# Patient Record
Sex: FEMALE | Race: WHITE
[De-identification: names, ages, dates, MRNs, and addresses within clinical notes are randomized per-mention and may not be internally consistent; named-entity substitution may affect disease eponyms.]

---

## 2022-01-07 ENCOUNTER — HOSPITAL ENCOUNTER (INPATIENT)
Dept: HOSPITAL 54 - ER | Age: 52
LOS: 3 days | Discharge: NURSING, REHAB OR LONG-TERM CARE | DRG: 137 | End: 2022-01-10
Attending: INTERNAL MEDICINE | Admitting: INTERNAL MEDICINE
Payer: COMMERCIAL

## 2022-01-07 VITALS — BODY MASS INDEX: 22.98 KG/M2 | WEIGHT: 143 LBS | HEIGHT: 66 IN

## 2022-01-07 VITALS — DIASTOLIC BLOOD PRESSURE: 58 MMHG | SYSTOLIC BLOOD PRESSURE: 133 MMHG

## 2022-01-07 DIAGNOSIS — Z99.11: ICD-10-CM

## 2022-01-07 DIAGNOSIS — J95.851: Primary | ICD-10-CM

## 2022-01-07 DIAGNOSIS — Z79.899: ICD-10-CM

## 2022-01-07 DIAGNOSIS — R65.20: ICD-10-CM

## 2022-01-07 DIAGNOSIS — E11.9: ICD-10-CM

## 2022-01-07 DIAGNOSIS — Z20.822: ICD-10-CM
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
## 2022-01-07 DIAGNOSIS — Z79.4: ICD-10-CM

## 2022-01-07 DIAGNOSIS — J90: ICD-10-CM

## 2022-01-07 DIAGNOSIS — J18.9: ICD-10-CM

## 2022-01-07 DIAGNOSIS — R13.10: ICD-10-CM

## 2022-01-07 DIAGNOSIS — D64.9: ICD-10-CM

## 2022-01-07 DIAGNOSIS — I48.20: ICD-10-CM

## 2022-01-07 DIAGNOSIS — I48.91: ICD-10-CM

## 2022-01-07 DIAGNOSIS — D68.59: ICD-10-CM

## 2022-01-07 DIAGNOSIS — J96.21: ICD-10-CM
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
## 2022-01-07 DIAGNOSIS — Z86.711: ICD-10-CM

## 2022-01-07 DIAGNOSIS — G25.9: ICD-10-CM

## 2022-01-07 DIAGNOSIS — Z79.01: ICD-10-CM

## 2022-01-07 DIAGNOSIS — A41.9: ICD-10-CM

## 2022-01-07 DIAGNOSIS — B96.89: ICD-10-CM

## 2022-01-07 DIAGNOSIS — N39.0: ICD-10-CM

## 2022-01-07 DIAGNOSIS — G93.40: ICD-10-CM

## 2022-01-07 DIAGNOSIS — Z79.84: ICD-10-CM

## 2022-01-07 LAB
ALBUMIN SERPL BCP-MCNC: 3.3 G/DL (ref 3.4–5)
ALP SERPL-CCNC: 95 U/L (ref 46–116)
ALT SERPL W P-5'-P-CCNC: 28 U/L (ref 12–78)
AST SERPL W P-5'-P-CCNC: 21 U/L (ref 15–37)
BASOPHILS # BLD AUTO: 0 K/UL (ref 0–0.2)
BASOPHILS # BLD AUTO: 0.1 K/UL (ref 0–0.2)
BASOPHILS NFR BLD AUTO: 0.4 % (ref 0–2)
BASOPHILS NFR BLD AUTO: 0.4 % (ref 0–2)
BILIRUB DIRECT SERPL-MCNC: 0.1 MG/DL (ref 0–0.2)
BILIRUB SERPL-MCNC: 0.2 MG/DL (ref 0.2–1)
BILIRUB UR QL STRIP: NEGATIVE
BUN SERPL-MCNC: 18 MG/DL (ref 7–18)
BUN SERPL-MCNC: 20 MG/DL (ref 7–18)
CALCIUM SERPL-MCNC: 8.6 MG/DL (ref 8.5–10.1)
CALCIUM SERPL-MCNC: 9.6 MG/DL (ref 8.5–10.1)
CHLORIDE SERPL-SCNC: 105 MMOL/L (ref 98–107)
CHLORIDE SERPL-SCNC: 106 MMOL/L (ref 98–107)
CO2 SERPL-SCNC: 27 MMOL/L (ref 21–32)
CO2 SERPL-SCNC: 32 MMOL/L (ref 21–32)
COLOR UR: (no result)
CREAT SERPL-MCNC: 0.9 MG/DL (ref 0.6–1.3)
CREAT SERPL-MCNC: 0.9 MG/DL (ref 0.6–1.3)
EOSINOPHIL NFR BLD AUTO: 0.6 % (ref 0–6)
EOSINOPHIL NFR BLD AUTO: 1.9 % (ref 0–6)
GLUCOSE SERPL-MCNC: 116 MG/DL (ref 74–106)
GLUCOSE SERPL-MCNC: 120 MG/DL (ref 74–106)
GLUCOSE UR STRIP-MCNC: NEGATIVE MG/DL
HCT VFR BLD AUTO: 28 % (ref 33–45)
HCT VFR BLD AUTO: 34 % (ref 33–45)
HGB BLD-MCNC: 11.1 G/DL (ref 11.5–14.8)
HGB BLD-MCNC: 9.2 G/DL (ref 11.5–14.8)
LEUKOCYTE ESTERASE UR QL STRIP: (no result)
LYMPHOCYTES NFR BLD AUTO: 18.4 % (ref 20–44)
LYMPHOCYTES NFR BLD AUTO: 2.3 K/UL (ref 0.8–4.8)
LYMPHOCYTES NFR BLD AUTO: 25.6 % (ref 20–44)
LYMPHOCYTES NFR BLD AUTO: 3.6 K/UL (ref 0.8–4.8)
MAGNESIUM SERPL-MCNC: 2 MG/DL (ref 1.8–2.4)
MCHC RBC AUTO-ENTMCNC: 33 G/DL (ref 31–36)
MCHC RBC AUTO-ENTMCNC: 33 G/DL (ref 31–36)
MCV RBC AUTO: 88 FL (ref 82–100)
MCV RBC AUTO: 89 FL (ref 82–100)
MONOCYTES NFR BLD AUTO: 0.6 K/UL (ref 0.1–1.3)
MONOCYTES NFR BLD AUTO: 0.6 K/UL (ref 0.1–1.3)
MONOCYTES NFR BLD AUTO: 4.4 % (ref 2–12)
MONOCYTES NFR BLD AUTO: 4.6 % (ref 2–12)
NEUTROPHILS # BLD AUTO: 9.4 K/UL (ref 1.8–8.9)
NEUTROPHILS # BLD AUTO: 9.6 K/UL (ref 1.8–8.9)
NEUTROPHILS NFR BLD AUTO: 67.7 % (ref 43–81)
NEUTROPHILS NFR BLD AUTO: 76 % (ref 43–81)
NITRITE UR QL STRIP: NEGATIVE
PH UR STRIP: 6.5 [PH] (ref 5–8)
PHOSPHATE SERPL-MCNC: 2.1 MG/DL (ref 2.5–4.9)
PLATELET # BLD AUTO: 345 K/UL (ref 150–450)
PLATELET # BLD AUTO: 409 K/UL (ref 150–450)
POTASSIUM SERPL-SCNC: 4.1 MMOL/L (ref 3.5–5.1)
POTASSIUM SERPL-SCNC: 4.7 MMOL/L (ref 3.5–5.1)
PROT SERPL-MCNC: 8.4 G/DL (ref 6.4–8.2)
PROT UR QL STRIP: 30 MG/DL
RBC # BLD AUTO: 3.18 MIL/UL (ref 4–5.2)
RBC # BLD AUTO: 3.81 MIL/UL (ref 4–5.2)
RBC #/AREA URNS HPF: (no result) /HPF (ref 0–2)
SODIUM SERPL-SCNC: 141 MMOL/L (ref 136–145)
SODIUM SERPL-SCNC: 142 MMOL/L (ref 136–145)
UROBILINOGEN UR STRIP-MCNC: 0.2 EU/DL
WBC NRBC COR # BLD AUTO: 12.4 K/UL (ref 4.3–11)
WBC NRBC COR # BLD AUTO: 14.2 K/UL (ref 4.3–11)

## 2022-01-07 PROCEDURE — A4624 TRACHEAL SUCTION TUBE: HCPCS

## 2022-01-07 PROCEDURE — 5A1945Z RESPIRATORY VENTILATION, 24-96 CONSECUTIVE HOURS: ICD-10-PCS | Performed by: INTERNAL MEDICINE

## 2022-01-07 PROCEDURE — U0003 INFECTIOUS AGENT DETECTION BY NUCLEIC ACID (DNA OR RNA); SEVERE ACUTE RESPIRATORY SYNDROME CORONAVIRUS 2 (SARS-COV-2) (CORONAVIRUS DISEASE [COVID-19]), AMPLIFIED PROBE TECHNIQUE, MAKING USE OF HIGH THROUGHPUT TECHNOLOGIES AS DESCRIBED BY CMS-2020-01-R: HCPCS

## 2022-01-07 PROCEDURE — G0378 HOSPITAL OBSERVATION PER HR: HCPCS

## 2022-01-07 RX ADMIN — Medication SCH EACH: at 18:09

## 2022-01-07 RX ADMIN — OXYCODONE HYDROCHLORIDE AND ACETAMINOPHEN SCH MG: 500 TABLET ORAL at 09:38

## 2022-01-07 RX ADMIN — APIXABAN SCH MG: 5 TABLET, FILM COATED ORAL at 09:38

## 2022-01-07 RX ADMIN — Medication SCH EACH: at 12:00

## 2022-01-07 RX ADMIN — PIPERACILLIN SODIUM AND TAZOBACTAM SODIUM SCH MLS/HR: .375; 3 INJECTION, POWDER, LYOPHILIZED, FOR SOLUTION INTRAVENOUS at 18:55

## 2022-01-07 RX ADMIN — IPRATROPIUM BROMIDE AND ALBUTEROL SULFATE SCH INHALER: 103; 18 AEROSOL, METERED RESPIRATORY (INHALATION) at 07:58

## 2022-01-07 RX ADMIN — DEXTROSE MONOHYDRATE SCH MLS/HR: 50 INJECTION, SOLUTION INTRAVENOUS at 20:06

## 2022-01-07 RX ADMIN — ACETAMINOPHEN PRN MG: 325 TABLET ORAL at 18:07

## 2022-01-07 RX ADMIN — PIPERACILLIN SODIUM AND TAZOBACTAM SODIUM SCH MLS/HR: .375; 3 INJECTION, POWDER, LYOPHILIZED, FOR SOLUTION INTRAVENOUS at 10:19

## 2022-01-07 RX ADMIN — FERROUS SULFATE TAB 325 MG (65 MG ELEMENTAL FE) SCH MG: 325 (65 FE) TAB at 09:38

## 2022-01-07 RX ADMIN — SODIUM CHLORIDE PRN MLS/HR: 9 INJECTION, SOLUTION INTRAVENOUS at 05:42

## 2022-01-07 RX ADMIN — DEXTROSE MONOHYDRATE SCH MLS/HR: 50 INJECTION, SOLUTION INTRAVENOUS at 15:57

## 2022-01-07 RX ADMIN — IPRATROPIUM BROMIDE AND ALBUTEROL SULFATE SCH INHALER: 103; 18 AEROSOL, METERED RESPIRATORY (INHALATION) at 13:39

## 2022-01-07 RX ADMIN — IPRATROPIUM BROMIDE AND ALBUTEROL SULFATE SCH PUFF: 103; 18 AEROSOL, METERED RESPIRATORY (INHALATION) at 22:20

## 2022-01-07 RX ADMIN — MEROPENEM SCH MLS/HR: 500 INJECTION INTRAVENOUS at 21:00

## 2022-01-07 RX ADMIN — LORAZEPAM PRN MG: 2 INJECTION INTRAMUSCULAR; INTRAVENOUS at 20:07

## 2022-01-07 RX ADMIN — Medication SCH EACH: at 05:28

## 2022-01-07 RX ADMIN — APIXABAN SCH MG: 5 TABLET, FILM COATED ORAL at 18:08

## 2022-01-07 NOTE — NUR
RT



Received pt in ER bed 5. Received pt on current settings as noted- pt tolerating settings 
well. Back up trach at bed side. Will continue to monitor throughout shift.

## 2022-01-07 NOTE — NUR
RESISTENCE WHILE SUCTIONING PT'S TRACH. TRACH CHANGED TO PORTEX 8 CUFF BY BY 
RADHA GIRON WITH RT; NO RESISTENCE. MUCUS PLUG OUT

## 2022-01-07 NOTE — NUR
PATIENT [id number] FROM [facility] C/O SOB. PATIENT IS VENT DEPENDENT NORMALLY ON 4L BUT 
WAS DESATURATING AT 80%. PARAMEDICS INCREASED TO 10L AND PATIENT SATURATIONS 
INCREASED TO 95%. PATIENT ALERT AND ORIENTED X 2

## 2022-01-07 NOTE — NUR
RN NOTE

PER MD ORDER, MERREM 500 MG IN IV 0.9% NS 50 ML WILL BE HELD, PHARMACY TO FOLLOW UP IN AM 
WITH EXTENDED INFUSION PROTOCOL.

## 2022-01-07 NOTE — NUR
RT NOTE

LATE ENTRY

Pt arrived via 100% fio2 bag mask. When placing pt on mech vent, pt unable to get adequate 
volumes and high peak airway pressures noted. Suction catheter was unable to pass through 
the trach tube. Trach Tube was switched from Bivona 8 to Portex 8 DCT per md orders. Pt able 
to acquire adequate VT from mech vent. Pt sx'd for thick large amt of yellow secretions. 
Alarms are set and audible. Trach is patent and secured. Ambu bag @ bedside. Will continue 
to monitor closely.

-------------------------------------------------------------------------------

Addendum: 01/07/22 at 0449 by ADDY HAQ RT

-------------------------------------------------------------------------------

Amended: Links added.

## 2022-01-07 NOTE — NUR
RT



Pt recvd on AC vent settings, trach is patent and secured. Pt is restrained and thrashing 
around on bed, no SOB noted. pulling on tubings. RN Carlos Alberto and Charge RN Sofi informed and are 
at bedside. Ambu bag and spare trach at bedside. Vent alarms are on and audible. Minimal 
white thin secretions. Will continue to monitor.

## 2022-01-07 NOTE — NUR
RN OPENING NOTE

RECEIVED CARE OF PATIENT WHILE PATIENT IN BED, A/O X1, AGITATED AND RESTLESS. REPOSITIONED 
PATIENT FOR COMFORT, USED THERAPEUTIC COMMUNICATION, AND USED RELAXATION TECHNIQUES TO CALM 
PATIENT. PATIENT REMAINS AGITATED. BILATERAL SOFT WRIST RESTRAINTS NOTED, NO SIGNS AND 
SYMPTOMS OF CIRCULATORY COMPLICATIONS. NO SIGNIFICANT FINDINGS UPON INITIAL NURSING 
ASSESSMENTS. [facility] TOLERATING SETTINGS WELL. L FA G#22 IV ACCESS NOTED SALINE 
LOCK IN PLACE WITH IVF NS @75CC/HR. HAAS CATH IN PLACE DRAINING WELL WITH CLEAR JEAN-CLAUDE 
URINE. ALL SAFETY PRECAUTION IMPLEMENTED. BED IS AT LOWEST POSITION AND LOCKED AND IN PLACE. 
BED IS SEMI FOWLERS, SIDE RAILS UP X2, CALL LIGHT WITHIN REACH. WILL CONTINUE TO MONITOR 
PATIENT FOR ANY CHANGES IN CONDITION.

## 2022-01-07 NOTE — NUR
NEW ROOM 116 PER NURSING SUP

-------------------------------------------------------------------------------

Addendum: 01/07/22 at 1430 by ABRIL

-------------------------------------------------------------------------------

ROOM 115 PER NURSING SUP

## 2022-01-07 NOTE — NUR
RN NOTE



PT RESTING IN BED. WITH BILATERAL SOFT RESTRAINTS ON, NO SIGNS AND SYMPTOMS OF CIRCULATORY 
COMPLICATIONS. PT ON MECH VENT, TOLERATING SETTINGS WELL. L HAND SALINE LOCK IN PLACE WITH 
IVF NS @75CC/HR. HAAS CATH IN PLACE DRAINING WELL WITH CLEAR JEAN-CLAUDE URINE. DUE MEDICATIONS 
GIVEN, PM CARE DONE. VITALS WNL. ALL SAFETY MEASURES FOLLOWED.

## 2022-01-08 VITALS — SYSTOLIC BLOOD PRESSURE: 103 MMHG | DIASTOLIC BLOOD PRESSURE: 72 MMHG

## 2022-01-08 VITALS — DIASTOLIC BLOOD PRESSURE: 52 MMHG | SYSTOLIC BLOOD PRESSURE: 106 MMHG

## 2022-01-08 VITALS — DIASTOLIC BLOOD PRESSURE: 76 MMHG | SYSTOLIC BLOOD PRESSURE: 140 MMHG

## 2022-01-08 VITALS — SYSTOLIC BLOOD PRESSURE: 138 MMHG | DIASTOLIC BLOOD PRESSURE: 83 MMHG

## 2022-01-08 VITALS — SYSTOLIC BLOOD PRESSURE: 140 MMHG | DIASTOLIC BLOOD PRESSURE: 76 MMHG

## 2022-01-08 VITALS — SYSTOLIC BLOOD PRESSURE: 101 MMHG | DIASTOLIC BLOOD PRESSURE: 59 MMHG

## 2022-01-08 LAB
ALBUMIN SERPL BCP-MCNC: 2.7 G/DL (ref 3.4–5)
ALP SERPL-CCNC: 79 U/L (ref 46–116)
ALT SERPL W P-5'-P-CCNC: 23 U/L (ref 12–78)
AST SERPL W P-5'-P-CCNC: 27 U/L (ref 15–37)
BASOPHILS # BLD AUTO: 0 K/UL (ref 0–0.2)
BASOPHILS NFR BLD AUTO: 0.4 % (ref 0–2)
BILIRUB SERPL-MCNC: 0.5 MG/DL (ref 0.2–1)
BUN SERPL-MCNC: 10 MG/DL (ref 7–18)
CALCIUM SERPL-MCNC: 8.5 MG/DL (ref 8.5–10.1)
CHLORIDE SERPL-SCNC: 106 MMOL/L (ref 98–107)
CHOLEST SERPL-MCNC: 245 MG/DL (ref ?–200)
CO2 SERPL-SCNC: 23 MMOL/L (ref 21–32)
CREAT SERPL-MCNC: 0.9 MG/DL (ref 0.6–1.3)
EOSINOPHIL NFR BLD AUTO: 3.1 % (ref 0–6)
GLUCOSE SERPL-MCNC: 89 MG/DL (ref 74–106)
HCT VFR BLD AUTO: 27 % (ref 33–45)
HDLC SERPL-MCNC: 41 MG/DL (ref 40–60)
HGB BLD-MCNC: 9.2 G/DL (ref 11.5–14.8)
LDLC SERPL DIRECT ASSAY-MCNC: 157 MG/DL (ref 0–99)
LYMPHOCYTES NFR BLD AUTO: 2.7 K/UL (ref 0.8–4.8)
LYMPHOCYTES NFR BLD AUTO: 26.5 % (ref 20–44)
MAGNESIUM SERPL-MCNC: 1.9 MG/DL (ref 1.8–2.4)
MCHC RBC AUTO-ENTMCNC: 34 G/DL (ref 31–36)
MCV RBC AUTO: 88 FL (ref 82–100)
MONOCYTES NFR BLD AUTO: 0.5 K/UL (ref 0.1–1.3)
MONOCYTES NFR BLD AUTO: 5.4 % (ref 2–12)
NEUTROPHILS # BLD AUTO: 6.6 K/UL (ref 1.8–8.9)
NEUTROPHILS NFR BLD AUTO: 64.6 % (ref 43–81)
PHOSPHATE SERPL-MCNC: 2 MG/DL (ref 2.5–4.9)
PLATELET # BLD AUTO: 326 K/UL (ref 150–450)
POTASSIUM SERPL-SCNC: 3.2 MMOL/L (ref 3.5–5.1)
PROT SERPL-MCNC: 7 G/DL (ref 6.4–8.2)
RBC # BLD AUTO: 3.11 MIL/UL (ref 4–5.2)
SODIUM SERPL-SCNC: 141 MMOL/L (ref 136–145)
TRIGL SERPL-MCNC: 211 MG/DL (ref 30–150)
WBC NRBC COR # BLD AUTO: 10.1 K/UL (ref 4.3–11)

## 2022-01-08 RX ADMIN — Medication SCH EACH: at 06:12

## 2022-01-08 RX ADMIN — Medication SCH EACH: at 12:06

## 2022-01-08 RX ADMIN — Medication SCH EACH: at 18:14

## 2022-01-08 RX ADMIN — DEXTROSE MONOHYDRATE SCH MLS/HR: 50 INJECTION, SOLUTION INTRAVENOUS at 04:54

## 2022-01-08 RX ADMIN — IPRATROPIUM BROMIDE AND ALBUTEROL SULFATE SCH PUFF: 103; 18 AEROSOL, METERED RESPIRATORY (INHALATION) at 14:02

## 2022-01-08 RX ADMIN — SODIUM CHLORIDE PRN MLS/HR: 9 INJECTION, SOLUTION INTRAVENOUS at 12:12

## 2022-01-08 RX ADMIN — Medication SCH EACH: at 00:14

## 2022-01-08 RX ADMIN — Medication PRN ML: at 21:43

## 2022-01-08 RX ADMIN — OXYCODONE HYDROCHLORIDE AND ACETAMINOPHEN SCH MG: 500 TABLET ORAL at 08:50

## 2022-01-08 RX ADMIN — IPRATROPIUM BROMIDE AND ALBUTEROL SULFATE SCH PUFF: 103; 18 AEROSOL, METERED RESPIRATORY (INHALATION) at 01:55

## 2022-01-08 RX ADMIN — APIXABAN SCH MG: 5 TABLET, FILM COATED ORAL at 16:04

## 2022-01-08 RX ADMIN — DEXTROSE MONOHYDRATE SCH MLS/HR: 50 INJECTION, SOLUTION INTRAVENOUS at 13:00

## 2022-01-08 RX ADMIN — APIXABAN SCH MG: 5 TABLET, FILM COATED ORAL at 08:50

## 2022-01-08 RX ADMIN — IPRATROPIUM BROMIDE AND ALBUTEROL SULFATE SCH PUFF: 103; 18 AEROSOL, METERED RESPIRATORY (INHALATION) at 21:15

## 2022-01-08 RX ADMIN — LORAZEPAM PRN MG: 2 INJECTION INTRAMUSCULAR; INTRAVENOUS at 08:50

## 2022-01-08 RX ADMIN — MEROPENEM SCH MLS/HR: 1 INJECTION INTRAVENOUS at 21:14

## 2022-01-08 RX ADMIN — DEXTROSE MONOHYDRATE SCH MLS/HR: 50 INJECTION, SOLUTION INTRAVENOUS at 21:49

## 2022-01-08 RX ADMIN — MEROPENEM SCH MLS/HR: 1 INJECTION INTRAVENOUS at 12:12

## 2022-01-08 RX ADMIN — MEROPENEM SCH MLS/HR: 500 INJECTION INTRAVENOUS at 05:15

## 2022-01-08 RX ADMIN — FERROUS SULFATE TAB 325 MG (65 MG ELEMENTAL FE) SCH MG: 325 (65 FE) TAB at 08:50

## 2022-01-08 NOTE — NUR
RN NOTE

PT RESTLESS AND ANXIOUS, ATIVAN 1MG, 0.5 ML GIVEN. WILL REASSESS AND CONTINUE OF CARE. 
CHARGE NURSE PEEWEE NOTIFIED.

## 2022-01-08 NOTE — NUR
RN NOTE



RECEIVED PATIENT IN BED, AO X 1, IN NO S/SX OF ACUTE DISTRESS AT THIS TIME. BREATHING 
UNLABORED, SATURATION AT 96% ON TRACH PORTEX#8 CONNECTED TO MECHANICAL VENT WITH SETTINGS AS 
PRESCRIBED: AC 15, , FIO2 40%, PEEP 5; SR ON THE MONITOR, HR IS 60. NOTED IV SITE AT 
LAC 22G, PATENT AND FLUSHING WELL, NO S/S OF INFECTION OR INFILTRATION WITH NS INFUSING AT 
75 ML/HR. BILATERAL SOFT WRIST RESTRAINS IN PLACE PER MD ORDER, SKIN AND CIRCULATION WAS 
CHECKED. NOTED GTUBE INTACT POSITIVE PLACEMENT NOTED, NO RESIDUAL, WITH ORDERS TO START TUBE 
FEEDING OF GLUCERNA 1.2 AT 60 ML/HR. HAAS CATHETER CONNECTED TO URINE BAG IN PLACE, 
DRAINING TO A CLEAR, YELLOW OUTPUT. SAFETY MEASURES IMPLEMENTED. PATIENT BED ALARM IS ON. 
HEAD OF BED ELEVATED. BED IS LOCKED,  IN LOWEST POSITION AND SIDE RAILS UP. CALL LIGHT 
WITHIN REACH OF THE PATIENT. WILL CONTINUE TO MONITOR AND REASSESS FOR ANY CHANGES.

## 2022-01-08 NOTE — NUR
RN NOTE



2100 DOSE OF VANCOMYCIN 0.75 GM ADMINISTERED PER PHARMACIST NOTE DATED 1/8/22 FROM [facility] Pharm: -WBC NORMALIZED, RENAL 

FUNCTION STABLE

-LAURA TROUGH 3 HOURS LATE; 47 IS NOT TRUE TROUGH

-GIVE DOSE FOR 2100 TONIGHT

-WILL DRAW ANOTHER TROUGH TOMORROW AT 2000 FOR GOAL 15-20; 

HOLD IF >20

-PHARMACY WILL MONITOR LABS AND FOLLOW UP DAILY



CHARGE NEENA CATALAN

## 2022-01-08 NOTE — NUR
RN CLOSING NOTE

ENDORSE PATIENT IN STABLE CONDITIONS, PATIENT IS IN BED, ASLEEP, BUT WAKES UP TO NAME. 
PATIENT HAS BILATERAL SOFT WRIST RESTRAINTS, NO SIGNS AND SYMPTOMS OF CIRCULATORY 
COMPLICATIONS THROUGHOUT SHIFT. NO SIGNIFICANT FINDINGS UPON ALL NURSING ASSESSMENTS. PT ON 
MECH VENT, TOLERATING SETTINGS WELL. LAC G#22 IV ACCESS NOTED SALINE LOCK IN PLACE WITH IVF 
NS @75CC/HR. HAAS CATH IN PLACE DRAINING WELL WITH CLEAR JEAN-CLAUDE URINE. ALL NEEDS ATTENDED 
TO, ALL DUE MEDS GIVEN. ALL SAFETY PRECAUTION IMPLEMENTED. BED IS AT LOWEST POSITION AND 
LOCKED AND IN PLACE. BED IS SEMI FOWLERS, SIDE RAILS UP X2, CALL LIGHT IS WITHIN REACH. 
ENDORSED PATIENT TO DAY SHIFT NURSE FOR KIM.

## 2022-01-08 NOTE — NUR
RN  NOTE

RECEIVED PATIENT IN STABLE CONDITIONS, PATIENT IS IN BED, ASLEEP, BUT WAKES UP TO NAME. 
PATIENT HAS BILATERAL SOFT WRIST RESTRAINTS,   PT ON MECH VENT, TOLERATING SETTINGS WELL. 
LAC G#22 IV ACCESS NOTED SALINE LOCK IN PLACE WITH IVF NS @75CC/HR. HAAS CATH IN PLACE 
DRAINING WELL WITH JEAN-CLAUDE COLOR URINE.  ALL SAFETY PRECAUTION IMPLEMENTED. BED IS AT LOWEST 
POSITION AND LOCKED AND IN PLACE. BED IS SEMI FOWLERS, SIDE RAILS UP X2, CALL LIGHT IS 
WITHIN REACH. WILL KIM

## 2022-01-08 NOTE — NUR
RN CLOSING NOTE

ENDORSE PATIENT IN STABLE CONDITIONS, PATIENT IS IN BED, ASLEEP, BUT WAKES UP TO NAME. 
PATIENT HAS BILATERAL SOFT WRIST RESTRAINTS, NO SIGNS AND SYMPTOMS OF CIRCULATORY 
COMPLICATIONS THROUGHOUT SHIFT. NO SIGNIFICANT FINDINGS UPON ALL NURSING ASSESSMENTS. [facility]. LAC G#22 IV ACCESS NOTED SALINE LOCK IN PLACE WITH IVF 
NS @75CC/HR. HAAS CATH IN PLACE DRAINING WELL WITH  JEAN-CLAUDE URINE. ALL NEEDS ATTENDED TO, ALL 
DUE MEDS GIVEN. ALL SAFETY PRECAUTION IMPLEMENTED. BED IS AT LOWEST POSITION AND LOCKED AND 
IN PLACE. BED IS SEMI FOWLERS, SIDE RAILS UP X2, CALL LIGHT IS WITHIN REACH. ENDORSED

## 2022-01-09 VITALS — SYSTOLIC BLOOD PRESSURE: 95 MMHG | DIASTOLIC BLOOD PRESSURE: 59 MMHG

## 2022-01-09 VITALS — DIASTOLIC BLOOD PRESSURE: 58 MMHG | SYSTOLIC BLOOD PRESSURE: 109 MMHG

## 2022-01-09 VITALS — SYSTOLIC BLOOD PRESSURE: 110 MMHG | DIASTOLIC BLOOD PRESSURE: 70 MMHG

## 2022-01-09 VITALS — DIASTOLIC BLOOD PRESSURE: 65 MMHG | SYSTOLIC BLOOD PRESSURE: 110 MMHG

## 2022-01-09 VITALS — SYSTOLIC BLOOD PRESSURE: 113 MMHG | DIASTOLIC BLOOD PRESSURE: 67 MMHG

## 2022-01-09 VITALS — SYSTOLIC BLOOD PRESSURE: 137 MMHG | DIASTOLIC BLOOD PRESSURE: 72 MMHG

## 2022-01-09 LAB
BASOPHILS # BLD AUTO: 0 K/UL (ref 0–0.2)
BASOPHILS NFR BLD AUTO: 0.5 % (ref 0–2)
BUN SERPL-MCNC: 9 MG/DL (ref 7–18)
CALCIUM SERPL-MCNC: 8.2 MG/DL (ref 8.5–10.1)
CHLORIDE SERPL-SCNC: 110 MMOL/L (ref 98–107)
CO2 SERPL-SCNC: 20 MMOL/L (ref 21–32)
CREAT SERPL-MCNC: 0.8 MG/DL (ref 0.6–1.3)
EOSINOPHIL NFR BLD AUTO: 1.9 % (ref 0–6)
EOSINOPHIL NFR BLD MANUAL: 1 % (ref 0–4)
FERRITIN SERPL-MCNC: 221 NG/ML (ref 8–388)
GLUCOSE SERPL-MCNC: 184 MG/DL (ref 74–106)
HCT VFR BLD AUTO: 26 % (ref 33–45)
HGB BLD-MCNC: 8.1 G/DL (ref 11.5–14.8)
LYMPHOCYTES NFR BLD AUTO: 2.2 K/UL (ref 0.8–4.8)
LYMPHOCYTES NFR BLD AUTO: 30.3 % (ref 20–44)
LYMPHOCYTES NFR BLD MANUAL: 31 % (ref 16–48)
MCHC RBC AUTO-ENTMCNC: 31 G/DL (ref 31–36)
MCV RBC AUTO: 94 FL (ref 82–100)
MONOCYTES NFR BLD AUTO: 0.3 K/UL (ref 0.1–1.3)
MONOCYTES NFR BLD AUTO: 4.3 % (ref 2–12)
MONOCYTES NFR BLD MANUAL: 2 % (ref 0–11)
NEUTROPHILS # BLD AUTO: 4.6 K/UL (ref 1.8–8.9)
NEUTROPHILS NFR BLD AUTO: 63 % (ref 43–81)
NEUTS SEG NFR BLD MANUAL: 66 % (ref 42–76)
PHOSPHATE SERPL-MCNC: 2.8 MG/DL (ref 2.5–4.9)
PLATELET # BLD AUTO: 249 K/UL (ref 150–450)
POTASSIUM SERPL-SCNC: 3.5 MMOL/L (ref 3.5–5.1)
RBC # BLD AUTO: 2.78 MIL/UL (ref 4–5.2)
SODIUM SERPL-SCNC: 140 MMOL/L (ref 136–145)
WBC NRBC COR # BLD AUTO: 7.3 K/UL (ref 4.3–11)

## 2022-01-09 RX ADMIN — OXYCODONE HYDROCHLORIDE AND ACETAMINOPHEN SCH MG: 500 TABLET ORAL at 08:14

## 2022-01-09 RX ADMIN — MEROPENEM SCH MLS/HR: 1 INJECTION INTRAVENOUS at 04:22

## 2022-01-09 RX ADMIN — Medication SCH EACH: at 17:40

## 2022-01-09 RX ADMIN — DEXTROSE MONOHYDRATE SCH MLS/HR: 50 INJECTION, SOLUTION INTRAVENOUS at 21:32

## 2022-01-09 RX ADMIN — IPRATROPIUM BROMIDE AND ALBUTEROL SULFATE SCH PUFF: 103; 18 AEROSOL, METERED RESPIRATORY (INHALATION) at 19:30

## 2022-01-09 RX ADMIN — MUPIROCIN SCH GM: 20 OINTMENT TOPICAL at 22:00

## 2022-01-09 RX ADMIN — Medication SCH EACH: at 06:04

## 2022-01-09 RX ADMIN — MEROPENEM SCH MLS/HR: 1 INJECTION INTRAVENOUS at 12:07

## 2022-01-09 RX ADMIN — FERROUS SULFATE TAB 325 MG (65 MG ELEMENTAL FE) SCH MG: 325 (65 FE) TAB at 08:14

## 2022-01-09 RX ADMIN — APIXABAN SCH MG: 5 TABLET, FILM COATED ORAL at 16:31

## 2022-01-09 RX ADMIN — LORAZEPAM PRN MG: 2 INJECTION INTRAMUSCULAR; INTRAVENOUS at 09:44

## 2022-01-09 RX ADMIN — MEROPENEM SCH MLS/HR: 1 INJECTION INTRAVENOUS at 21:32

## 2022-01-09 RX ADMIN — APIXABAN SCH MG: 5 TABLET, FILM COATED ORAL at 08:14

## 2022-01-09 RX ADMIN — Medication PRN ML: at 18:22

## 2022-01-09 RX ADMIN — QUETIAPINE SCH MG: 25 TABLET, FILM COATED ORAL at 13:51

## 2022-01-09 RX ADMIN — DEXTROSE MONOHYDRATE SCH MLS/HR: 50 INJECTION, SOLUTION INTRAVENOUS at 12:49

## 2022-01-09 RX ADMIN — ACETAMINOPHEN PRN MG: 325 TABLET ORAL at 20:59

## 2022-01-09 RX ADMIN — IPRATROPIUM BROMIDE AND ALBUTEROL SULFATE SCH PUFF: 103; 18 AEROSOL, METERED RESPIRATORY (INHALATION) at 01:12

## 2022-01-09 RX ADMIN — Medication SCH EACH: at 11:50

## 2022-01-09 RX ADMIN — DEXTROSE MONOHYDRATE SCH MLS/HR: 50 INJECTION, SOLUTION INTRAVENOUS at 04:23

## 2022-01-09 RX ADMIN — LORAZEPAM PRN MG: 2 INJECTION INTRAMUSCULAR; INTRAVENOUS at 20:58

## 2022-01-09 RX ADMIN — Medication SCH EACH: at 00:27

## 2022-01-09 RX ADMIN — DEXTROSE MONOHYDRATE SCH MLS/HR: 50 INJECTION, SOLUTION INTRAVENOUS at 21:00

## 2022-01-09 RX ADMIN — LORAZEPAM PRN MG: 2 INJECTION INTRAMUSCULAR; INTRAVENOUS at 12:39

## 2022-01-09 RX ADMIN — LORAZEPAM PRN MG: 2 INJECTION INTRAMUSCULAR; INTRAVENOUS at 00:46

## 2022-01-09 RX ADMIN — SODIUM CHLORIDE PRN MLS/HR: 9 INJECTION, SOLUTION INTRAVENOUS at 16:34

## 2022-01-09 NOTE — NUR
RN NOTE



2100 DOSE OF VANCOMYCIN 0.75 GM ADMINISTERED PER PHARMACIST NOTE DATED 1/8/22 FROM [facility] MONTESINOS Pharm: -WBC NORMALIZED, RENAL 

FUNCTION STABLE

-LAURA TROUGH 3 HOURS LATE; 47 IS NOT TRUE TROUGH

-GIVE DOSE FOR 2100 TONIGHT

-WILL DRAW ANOTHER TROUGH TOMORROW AT 2000 FOR GOAL 15-20; 

HOLD IF >20

-PHARMACY WILL MONITOR LABS AND FOLLOW UP DAILY



1/9/2022 0420 - TELEPHONE CALL TO [facility] ON CALL 

PHARMACY, SPOKE WITH LAURA, VERIFIED IF OK TO GIVE 0500 

DOSE OF VANCOMYCIN, HE STATED YES.



CHARGE RN JOAN CATALAN

## 2022-01-09 NOTE — NUR
TELE RN NOTE 

 DR FLORES AT BEDSIDE UPDATED PATIENT CONDITION,AWARE THAT PATIENT VERY AGITATED AND TRYING TO 
REMOVE ALL LINE OK TO CONT WITH ATIVAN Q 3 HOUR PRN PER ORDER DR BAIRD ALSO OK TO START 
SEROQUEL FROM HOME DOSES , WILL F]U

## 2022-01-09 NOTE — NUR
RN NOTES



PATIENT NOTED WITH TEMPERATURE 99.6 FAHRENHEIT, COOLING MEASURE PROVIDED, ACETAMINOPHEN 
GIVEN AS PER MD'S ORDER. CONTINUE TO MONITOR.

## 2022-01-09 NOTE — NUR
RN TELE OPENING NOTE



RECEIVED PATIENT IN BED, ASLEEP, A/O X1, WAKES UP WHEN CALLED BY NAME. PATIENT HAS BILATERAL 
SOFT WRIST RESTRAINTS,   PT ON MECH VENT, TOLERATING SETTINGS WELL. RIGHT WRIST G#22 IV 
ACCESS NOTED SALINE LOCK IN PLACE WITH IVF NS @75CC/HR. HAAS CATH IN PLACE DRAINING WELL 
WITH JEAN-CLAUDE COLORED URINE.  ALL SAFETY PRECAUTION IMPLEMENTED. BED IS AT LOWEST POSITION AND 
LOCKED AND IN PLACE. BED IS SEMI FOWLERS, SIDE RAILS UP X2, CALL LIGHT IS WITHIN REACH. WILL 
CONTINUE TO MONITOR.

## 2022-01-09 NOTE — NUR
tele rn note  

patiient in bed , all needs attended, on tele,monitor st 107 at this time, on 2l nc no sob 
noted at this time ,  on ivf as ordered ,bed in lowest and locked position , call light 
within reach

## 2022-01-09 NOTE — NUR
RN TELE NOTE



DOCTOR OLI CHANGED ORDER OF ATIVAN Q6H PRN TO Q3H PRN. Reason for Admission: LOLITA                     RUR Score:          NA           Plan for utilizing home health:      None    PCP: First and Last name:  Jessica Larson MD     Name of Practice:    Are you a current patient: Yes/No: yes   Approximate date of last visit: 9/14/21   Can you participate in a virtual visit with your PCP: Yes                    Current Advanced Directive/Advance Care Plan: Full Code      Healthcare Decision Maker:   Click here to complete [id number] including selection of the Healthcare Decision Maker Relationship (ie \"Primary\")             Primary Decision Maker: [id number] [phone]                  Transition of Care Plan:                      Patient lives at home independently with her spouse. Spouse will  when ready for discharge. Dispo: Home/Self.

## 2022-01-09 NOTE — NUR
RN NOTE



TELEPHONE CALL TO NEXT OF KIN, JIM [phone], ADVISED HIM TRACH WAS CHANGED 
1/8/2022. HE SAID HE WANTS TO DO VIDEO CALL WITH PATIENT IN THE AFTERNOON. WILL ENDORSE TO 
RAJESH CHAUDHARY

## 2022-01-09 NOTE — NUR
TELE RN NOTE 

 DR FLORES AT BEDSIDE AWARE THAT PATIENT STILL AGITATED O FACE TO FACE ASSESSMENT DONE OK TO 
RENEW SOFT RESTRAIN, WILL F\U

## 2022-01-09 NOTE — NUR
RN OPENING NOTES



RECEIVED PATIENT IN BED, AWAKE, NO SOB NOTED NOT IN DISTRESS, A/O X1,   PT ON MECH VENT, 
TOLERATING SETTINGS WELL. PATIENT NOTED WITH RIGHT WRIST G#22 IV ACCESS, PATENT INTACT AND 
FLUSHED WITH NORMAL SALINE AND RUNNING  WITH IVF NS @75CC/HR. HAAS CATH IN PLACE DRAINING 
WELL. PATIENT WITH GTUBE FEEDING RUNNING WITH GLUCERNA 1.2 @ 60ML/HR, IN PLACED INTACT, WITH 
NO RESIDUAL NOTED UPON ASPIRATION. PATIENT WITH BILATERAL WRIST SOFT RESTRAINT,  ALL SAFETY 
PRECAUTION IMPLEMENTED. BED IS AT LOWEST POSITION AND LOCKED. BED ALARM ARMED.  CALL LIGHT 
IS WITHIN REACH. WILL CONTINUE TO MONITOR

## 2022-01-09 NOTE — NUR
RN TELE NOTE



PATIENT AGITATED, TRIED TO PULL IV LINE, GIVEN ATIVAN PRN VIA MIDLINE. O2 SATURATION 98%, 
HEART RATE 105.

## 2022-01-10 VITALS — SYSTOLIC BLOOD PRESSURE: 136 MMHG | DIASTOLIC BLOOD PRESSURE: 70 MMHG

## 2022-01-10 VITALS — DIASTOLIC BLOOD PRESSURE: 73 MMHG | SYSTOLIC BLOOD PRESSURE: 130 MMHG

## 2022-01-10 VITALS — SYSTOLIC BLOOD PRESSURE: 126 MMHG | DIASTOLIC BLOOD PRESSURE: 73 MMHG

## 2022-01-10 VITALS — DIASTOLIC BLOOD PRESSURE: 78 MMHG | SYSTOLIC BLOOD PRESSURE: 124 MMHG

## 2022-01-10 RX ADMIN — Medication SCH EACH: at 00:50

## 2022-01-10 RX ADMIN — IPRATROPIUM BROMIDE AND ALBUTEROL SULFATE SCH PUFF: 103; 18 AEROSOL, METERED RESPIRATORY (INHALATION) at 01:30

## 2022-01-10 RX ADMIN — MEROPENEM SCH MLS/HR: 1 INJECTION INTRAVENOUS at 12:05

## 2022-01-10 RX ADMIN — FERROUS SULFATE TAB 325 MG (65 MG ELEMENTAL FE) SCH MG: 325 (65 FE) TAB at 08:34

## 2022-01-10 RX ADMIN — QUETIAPINE SCH MG: 25 TABLET, FILM COATED ORAL at 08:34

## 2022-01-10 RX ADMIN — SODIUM CHLORIDE PRN MLS/HR: 9 INJECTION, SOLUTION INTRAVENOUS at 09:04

## 2022-01-10 RX ADMIN — APIXABAN SCH MG: 5 TABLET, FILM COATED ORAL at 08:35

## 2022-01-10 RX ADMIN — DEXTROSE MONOHYDRATE SCH MLS/HR: 50 INJECTION, SOLUTION INTRAVENOUS at 04:45

## 2022-01-10 RX ADMIN — MUPIROCIN SCH GM: 20 OINTMENT TOPICAL at 08:34

## 2022-01-10 RX ADMIN — OXYCODONE HYDROCHLORIDE AND ACETAMINOPHEN SCH MG: 500 TABLET ORAL at 08:34

## 2022-01-10 RX ADMIN — IPRATROPIUM BROMIDE AND ALBUTEROL SULFATE SCH PUFF: 103; 18 AEROSOL, METERED RESPIRATORY (INHALATION) at 13:37

## 2022-01-10 RX ADMIN — MEROPENEM SCH MLS/HR: 1 INJECTION INTRAVENOUS at 04:43

## 2022-01-10 RX ADMIN — IPRATROPIUM BROMIDE AND ALBUTEROL SULFATE SCH PUFF: 103; 18 AEROSOL, METERED RESPIRATORY (INHALATION) at 07:26

## 2022-01-10 RX ADMIN — Medication SCH EACH: at 17:19

## 2022-01-10 RX ADMIN — Medication SCH EACH: at 05:46

## 2022-01-10 RX ADMIN — LORAZEPAM PRN MG: 2 INJECTION INTRAMUSCULAR; INTRAVENOUS at 09:01

## 2022-01-10 RX ADMIN — APIXABAN SCH MG: 5 TABLET, FILM COATED ORAL at 17:07

## 2022-01-10 RX ADMIN — Medication SCH EACH: at 12:04

## 2022-01-10 NOTE — NUR
RN NOTE



PATIENT IS BEING DISCHARGE. PATIENT IN STABLE CONDITION WITH NO SIGN OF DISTRESS AT TIME OF 
DISCHARGE. [facility]PORT WAS GIVEN TO AMBULANCE AND 
RT FOR TRANSPORT. ALL PAPER SIGNED. DISCHARGE PACKAGE WAS GIVEN TO AMBULANCE TRANSPORT 
PERSONAL.

## 2022-01-10 NOTE — NUR
RN OPENING NOTE



RECEIVE REPORT FROM NIGHT SHIFT NURSE. PATIENT IN STABLE CONDITION. WILL FOLLOW UP AM LABS 
AND DOCTORS ORDERS. PROPER ISOLATION IN PLACE. ALL SAFETY MEASURE IN PLACE. BED ON LOWEST 
POSITION WITH HO  ELEVATED. CALL LIGHT WITHIN REACH. WILL CONTINUE TO MONITOR.

## 2022-01-10 NOTE — NUR
RN CLOSING NOTES





REMAIN STABLE THROUGH PUT THE SHIFT, NO SOB NOTED NOT IN DISTRESS, A/O X1,   PT ON MECH 
VENT, TOLERATING SETTINGS WELL. PATIENT NOTED WITH RIGHT WRIST G#22 IV ACCESS, PATENT INTACT 
AND FLUSHED WITH NORMAL SALINE AND RUNNING  WITH IVF NS @75CC/HR. HAAS CATH IN PLACE 
DRAINING WELL. PATIENT WITH GTUBE FEEDING RUNNING WITH GLUCERNA 1.2 @ 60ML/HR, IN PLACED 
INTACT.  ALL DUE MEDS GIVEN AS ORDERED. PATIENT WITH BILATERAL WRIST SOFT RESTRAINT,  ALL 
SAFETY PRECAUTION IMPLEMENTED. BED IS AT LOWEST POSITION AND LOCKED. BED ALARM ARMED.  CALL 
LIGHT IS WITHIN REACH. WILL CONTINUE TO MONITOR